# Patient Record
Sex: MALE | Race: WHITE | NOT HISPANIC OR LATINO | URBAN - METROPOLITAN AREA
[De-identification: names, ages, dates, MRNs, and addresses within clinical notes are randomized per-mention and may not be internally consistent; named-entity substitution may affect disease eponyms.]

---

## 2024-05-06 ENCOUNTER — APPOINTMENT (RX ONLY)
Dept: URBAN - METROPOLITAN AREA CLINIC 53 | Facility: CLINIC | Age: 39
Setting detail: DERMATOLOGY
End: 2024-05-06

## 2024-05-06 PROBLEM — D23.9 OTHER BENIGN NEOPLASM OF SKIN, UNSPECIFIED: Status: ACTIVE | Noted: 2024-05-06

## 2024-05-06 PROCEDURE — ? PREVENTATIVE SKIN CANCER SCREENING

## 2024-05-06 NOTE — PROCEDURE: PREVENTATIVE SKIN CANCER SCREENING
Detail Level: Simple
Initial Screening Text: Skin cancer screening performed of all exposed skin.  No lesions of concern were identified today.  \\n\\nWe have recommended an annual full body skin exam and daily use of broad spectrum spf 30+ sunscreen.
Billing Warning: If you want to bill the  [id number] cpt codes you must manually override the bill.
Initial Vs Subsequent Screenings?: Initial